# Patient Record
Sex: MALE | Race: WHITE | NOT HISPANIC OR LATINO | URBAN - NONMETROPOLITAN AREA
[De-identification: names, ages, dates, MRNs, and addresses within clinical notes are randomized per-mention and may not be internally consistent; named-entity substitution may affect disease eponyms.]

---

## 2018-04-09 ENCOUNTER — NON-PROVIDER VISIT (OUTPATIENT)
Dept: URGENT CARE | Facility: PHYSICIAN GROUP | Age: 53
End: 2018-04-09

## 2018-04-09 DIAGNOSIS — Z02.1 PRE-EMPLOYMENT DRUG SCREENING: ICD-10-CM

## 2018-04-09 PROCEDURE — 8279 PR URINE 6 PANEL - SEND TO LAB: Performed by: PHYSICIAN ASSISTANT

## 2020-01-30 ENCOUNTER — HOSPITAL ENCOUNTER (OUTPATIENT)
Facility: MEDICAL CENTER | Age: 55
End: 2020-01-30
Attending: PSYCHIATRY & NEUROLOGY

## 2020-01-30 PROCEDURE — 99214 OFFICE O/P EST MOD 30 MIN: CPT | Performed by: PSYCHIATRY & NEUROLOGY

## 2020-01-30 NOTE — PROGRESS NOTES
"PSYCHIATRIC INTAKE EVALUATION  Patient was presented for a telehealth consultation via secure and encrypted videoconferencing technology.     *Reason for admission: had texted friends and brother about SI.  *Reason for consult:  Depression, SI      *Requesting Physician/APN:  MESFIN Egan MD           Legal Hold status: +           *Chief Complaint: I m not SI     *HPI (includes Psychiatric ROS): 54 male yo male who quite his job in December because of conflicts with the boss and hasn't been able to find a job since. His car was being repossessed , he couldn't pay his house bills, and he was running out of cash when he sent the text. Since then his brother, whose house he lives in, is willing to pay the bills and his cousin, Peter, has found possible work through [employer]. He had been feeling hopeless and wanting to be dead though denies actual SI and any plan, his appetite was \"good\" though he ate \"the wrong things\" and lost 10 lbs. Sleep has been intermittent.    Depression: + as noted above. He denies SI, hopelessness now.  Anum: denies  PTSD: not assessed  Psychosis: denies    Anxiety: 2 years ago was prescribed xanax 1 mg bid prn which he takes about 2 times a week (there are concerns about the accuracy of this report) and hasn't taken it in 10 days approximately. Per RN who was in the room with the interview, he had a grand mal sz this am. Pt does not remember. BZ withdrawal ? He is currently worried about the future though less so than before.    Other: pt is having memory issues.       *Medical Review Of Symptoms (not dx conditions):   Review of Systems   Constitutional: Negative.    HENT: Negative.    Eyes: Negative.    Respiratory: Negative.    Cardiovascular: Negative.    Gastrointestinal: Negative.    Musculoskeletal: Negative.    Skin: Negative.    Neurological: Positive for seizures. Loss of consciousness: this am per RN. pt does not remember.   Psychiatric/Behavioral: Positive for depression. " "The patient is nervous/anxious and has insomnia.       *Psychiatric Examination:   Vitals:reviewed in the records: unremarkable  General Appearance: normal habitus, good eye contact, cooperative  Abnormal Movements: none  Gait and Posture:sitting in a chair  Speech:wnl  Thought Process: normal rate  Associations:linear  Abnormal or Psychotic Thoughts:denies  Judgement and Insight:fair  Orientation: off a few days on the specific date but otherwise wnl  Recent and Remote Memory: 1/3 on testing. May have other deficits that are not overt as well  Attention Span and Concentration:intact  Language:fluid  Fund of Knowledge:not tested  Mood and Affect:depressed but now more hopeful. Mood a \"3\" (good) . Looks euthymic  SI/HI: denies       *PAST MEDICAL/PSYCH/FAMILY/SOCIAL(as reported by patient):       *medical hx:        TBI:a few concussions as a child  SZ:this am per RN    Past Medical History:   Diagnosis Date   • Chest pain 11/12/2012   • Hyperlipidemia 11/12/2012   • Palpitations 11/12/2012   • Sinus bradycardia 11/12/2012     No past surgical history on file.     *psychiatric hx:   SAs: denies     Hospitalizations:denies  Med Hx: says he was given flexeril to substitute for xanax (this sounds odd) but didn't like it so hasn't taken it (per nurse he has continued to fill it however), gabapentin was also tried and he didn't like it either, doxepin 10 mg for sleep, didn't like it, many SSRIs (named) and welbutrin which gave him stomach problems. He didn't remember some of these meds until the nurse brought them up.   Dx Hx: 2 years ago while [place], looked to the side and saw the drop off and had his first panic attack and was given xanax. Hasn't had a panic attack for one year and ahalf and still takes it occasionally. \"anxiety\" is described as thinking too much and worrying. It slows him down. He denies other symptoms of kaylen.      *family Psych hx:  denies     *social hx:as noted above. He has " a BA in engineering. States that in school in the 2 nd and again in the 6 th grade he was at least 2 years ahead of his classmates and would be told to sit and be quiet rather than be placed in advanced classes .  Alcohol: none since age 28  Drugs:denies     *MEDICAL HX: labs, MARS, medications, etc were reviewed. Only those findings of potential interest to psychiatry are noted below:    *Current Medical issues: see bekiw       *Allergies:  No Known Allergies  *Current Medications:  No current outpatient medications on file.  *EKG: unable to access  *Imaging: per RN, CT was read as negative     *Labs:   WBC: 2.9  LFTs, alk phos, LDH: elevated  Plates: 95  Albumin 3.3  Alb/globulin: 0.9     *ASSESSMENT/PLAN:    1. Adjustment disorder severe with depressed and anxious mood: improved  - R/O independent anxiety disorder with hx of panic attacks  -have called his cousin Peter [phone] in the morning and this afternoon for collateral (no brother's number and no answer at mom's) but not reply. Left [facility] call back number. Have not been paged. This is to confirm that family is helping and for collateral regarding some of his hx.           2. BZ use      -R/O dependence: the cause of his sz? Note: pt received 120 xanax tabs on 1/14/2020.     3. Neurocognitive disorder unspc  -pt has memory problems but why?   -recommend speech cognitive testing. Pt says that he was 2 years ahead of his school mates in school, has a BA in engineering, last job was a  and now he lives in a house owned by his brother, can't find a job..... he seems to be functioning at a much lower level than would be expected. He did not seemed concerned about the sz or his liver, he may not understand.     4. Medical:  -per ICU RN: pt had a grand mal sz this am: etiology?  -thrombocytopenia and leucocytosis  -elevated LFTs, LDH, alkphos  -albumin and albumin ration decrease.   -R/O ongoing alcohol use vs other etiology for liver  pathology. If he is drinking which he denies, this could also explain the sz.  -R/O dependence on flexeril and withdrawals.     Legal hold: extended  1. If collateral is obtained and fits what he has said, legal hold could be dropped but without it I am reluctant to dc it.  2. Recommend starting zoloft 25 mg and titrating up every few days by another 25 mg to target dose on 100 mg a day.   3. If legal hold dc'd needs counseling and mental health f/u referrals  4. Recommend all those extra bottles of meds that he reportedly does not use be removed by family.  5. recommend dc of xanax and that he be involved in counseling, learning meditation, etc.  6. Transfer inpt unless collateral received AND compelling enough to release from hold.   7. Would recommend that visitors be allowed and phone.     *Signing off  *Thank you for the consult

## 2020-01-31 NOTE — PROGRESS NOTES
"Psychiatric Progress Note    Pt not seen. Called brother, no reply. Called Maryellen: she says that recently pt has been very depressed and she strongly believes he has been drinking as they found small Yaegermiester shot bottles half way up the outside trash can. She also found xanax bottles. On welfare checks x 2 from [facility] police, pt answered the door naked and police said they smelled alcohol. He has mentioned that he is going to be giving things away and if anyone wants any thing they need to come by in the next 3 weeks. She has removed his guns.     At times has said things that don't make sense. He will ask \"when are you going to [place]?\" but she was never going to go to [place]. She has not guaranteed a job.     He stays in the house with all the shades drawn. He has had 2 cars reposesed. He has always worked for the [employer] but will quite jobs suddenly when he gets angry.     He has been sober for alcohol for many many years. He never even had alcohol at home to even offer others. Pt told me the beers he had were for someone else. She is unaware of a prior psych hx.       *ASSESSMENT/PLAN:  1. Adjustment disorder severe with depressed and anxious mood: severe  - plans to kill self over 3 weeks  -Recommend zoloft as on intake as noted            2. BZ Dependence and R/O use disorder  -        3. Alcohol use disorder: severe  -relapse after many years of sobriety  -many recent falls   -answering the door unclothed  -withdrawal sz, grand mal type per RN        4. Neurocognitive disorder unsepc  -R/O wernicke korsakoff's     5. Medical  -withdrawal sz grand mal type from alcohol and bz  -other recommendations as noted on intake  -chronic back pain which should be addressed if possible with none narcotic, non muscle relaxant for the time being at least until his depression is stabilized.    *Legal hold: extended: transfer to in psych               Signing off. Thank you. I do not need to talk to brother.           "